# Patient Record
Sex: MALE | Race: WHITE | NOT HISPANIC OR LATINO | Employment: PART TIME | URBAN - METROPOLITAN AREA
[De-identification: names, ages, dates, MRNs, and addresses within clinical notes are randomized per-mention and may not be internally consistent; named-entity substitution may affect disease eponyms.]

---

## 2024-09-13 ENCOUNTER — HOSPITAL ENCOUNTER (EMERGENCY)
Facility: CLINIC | Age: 19
Discharge: HOME | End: 2024-09-13
Attending: PHYSICIAN ASSISTANT | Admitting: PHYSICIAN ASSISTANT
Payer: COMMERCIAL

## 2024-09-13 VITALS
RESPIRATION RATE: 18 BRPM | SYSTOLIC BLOOD PRESSURE: 110 MMHG | DIASTOLIC BLOOD PRESSURE: 48 MMHG | TEMPERATURE: 98.6 F | HEART RATE: 61 BPM | OXYGEN SATURATION: 96 %

## 2024-09-13 DIAGNOSIS — S61.210A LACERATION OF RIGHT INDEX FINGER WITHOUT FOREIGN BODY WITHOUT DAMAGE TO NAIL, INITIAL ENCOUNTER: ICD-10-CM

## 2024-09-13 PROCEDURE — 12001 RPR S/N/AX/GEN/TRNK 2.5CM/<: CPT | Mod: F6 | Performed by: PHYSICIAN ASSISTANT

## 2024-09-13 PROCEDURE — 99282 EMERGENCY DEPT VISIT SF MDM: CPT | Performed by: PHYSICIAN ASSISTANT

## 2024-09-13 ASSESSMENT — COLUMBIA-SUICIDE SEVERITY RATING SCALE - C-SSRS
1. IN THE PAST MONTH, HAVE YOU WISHED YOU WERE DEAD OR WISHED YOU COULD GO TO SLEEP AND NOT WAKE UP?: NO
2. HAVE YOU ACTUALLY HAD ANY THOUGHTS OF KILLING YOURSELF IN THE PAST MONTH?: NO
6. HAVE YOU EVER DONE ANYTHING, STARTED TO DO ANYTHING, OR PREPARED TO DO ANYTHING TO END YOUR LIFE?: NO

## 2024-09-13 NOTE — ED PROVIDER NOTES
History     Chief Complaint   Patient presents with    Laceration     Right hand, second digit ( fishing_)   Northern cut hand      HPI  Josh Marks is a 19 year old male who presents to urgent care with concern over laceration to his right index finger.  Patient was fishing and as he was attempting to remove fish from hook he was cut with the tooth of the fish.  He has minimal pain in the area 2/10 on the pain scale.  He does complain of decreased sensation.  No suspicion for foreign body embedded in the room.  He is unsure the date of his last tetanus vaccine.      Allergies:  No Known Allergies    Problem List:    There are no problems to display for this patient.     Past Medical History:    No past medical history on file.    Past Surgical History:    No past surgical history on file.    Family History:    No family history on file.    Social History:  Marital Status:  Single [1]        Medications:    No current outpatient medications on file.    Review of Systems  INTEGUMENTARY/SKIN: POSITIVE for laceration right index finger  MUSCULOSKELETAL: POSITIVE  for right index finger pain at site of laceration   NEURO: POSITIVE for numbness/decreased sensation NEGATIVE for paresthesias   Physical Exam    /48   Pulse 61   Temp 98.6  F (37  C) (Tympanic)   Resp 18   SpO2 96%   Physical Exam  Constitutional:       General: He is not in acute distress.     Appearance: He is not ill-appearing or toxic-appearing.   Cardiovascular:      Pulses:           Carotid pulses are 2+ on the right side.  Musculoskeletal:      Right hand: Laceration present. No swelling, deformity, tenderness or bony tenderness. Normal range of motion. Normal strength. Normal sensation. There is no disruption of two-point discrimination. Normal capillary refill. Normal pulse.   Skin:     General: Skin is warm and dry.      Findings: Laceration (2 cm laceration to dosal aspect of right index finger just distal to DIP joint)  present. No abrasion, ecchymosis, erythema or rash.   Neurological:      Mental Status: He is alert.      Sensory: No sensory deficit.      Comments: Sensation to light touch of right index finger is grossly intact       ED Course        [facility]    -Laceration Repair    Date/Time: 9/13/2024 4:30 PM    Performed by: Stephanie Styles PA-C  Authorized by: Stephanie Styles PA-C    Risks, benefits and alternatives discussed.      ANESTHESIA (see MAR for exact dosages):     Anesthesia method:  Local infiltration    Local anesthetic:  Bupivacaine 0.25% w/o epi  LACERATION DETAILS     Location: right index finger.    Length (cm):  2    REPAIR TYPE:     Repair type:  Simple    EXPLORATION:     Hemostasis achieved with:  Direct pressure    Wound extent: no nerve damage, no tendon damage and no underlying fracture      TREATMENT:     Area cleansed with:  Hibiclens    Amount of cleaning:  Standard    SKIN REPAIR     Repair method:  Sutures    Suture size:  5-0    Suture material:  Nylon    Suture technique:  Simple interrupted    Number of sutures:  5    APPROXIMATION     Approximation:  Close    POST-PROCEDURE DETAILS     Dressing:  Antibiotic ointment      PROCEDURE  Describe Procedure: However patient did begin to have vasovagal response became pale, mildly diaphoretic complaints of lightheadedness, was placed supine on bed with improvement of his symptoms.   Patient Tolerance:  Patient tolerated the procedure well with no immediate complications         Critical Care time:  none        No results found for this or any previous visit (from the past 24 hour(s)).  Medications - No data to display    Assessments & Plan (with Medical Decision Making)     I have reviewed the nursing notes.  I have reviewed the findings, diagnosis, plan and need for follow up with the patient.     There are no discharge medications for this patient.    Final diagnoses:   Laceration of right index finger without foreign  body without damage to nail, initial encounter     19-year-old male presents urgent care with concern over laceration to right index finger after he was cut with fish tooth just prior to arrival.  Physical exam findings significant for 2 cm subcutaneous laceration to the dorsal aspect of his distal third of the right index finger.  After discussing versus benefits patient agreed to proceed with primary closure of his wounds.  He tolerated placement of five 5-0 Ethilon he had complications other than he has a vagal response where he became pale, diaphoretic and complains of lightheadedness.  He was placed on the bed supine and had no resolution of his symptoms.  He was discharged home stable with instructions for suture removal in 10 days.  Suture care instructions, signs of infection, worrisome reasons to return to the ER/UC sooner discussed.     Disclaimer: This note consists of symbols derived from keyboarding, dictation, and/or voice recognition software. As a result, there may be errors in the script that have gone undetected.  Please consider this when interpreting information found in the chart.    9/13/2024   [facility] EMERGENCY DEPT       Stephanie Styles PA-C  09/13/24 [id number]